# Patient Record
Sex: MALE | Race: WHITE | NOT HISPANIC OR LATINO | Employment: FULL TIME | URBAN - METROPOLITAN AREA
[De-identification: names, ages, dates, MRNs, and addresses within clinical notes are randomized per-mention and may not be internally consistent; named-entity substitution may affect disease eponyms.]

---

## 2021-03-30 ENCOUNTER — TELEPHONE (OUTPATIENT)
Dept: PSYCHIATRY | Facility: CLINIC | Age: 46
End: 2021-03-30

## 2021-03-30 NOTE — TELEPHONE ENCOUNTER
Behavorial Health Outpatient Intake Questions    Referred by: Therapist     Please advised interviewee that they need to answer all questions truthfully to allow for best care and any misrepresentations of information may affect their ability to be seen at this clinic   => Was this discussed? 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 ? If yes, can they speak to you on the phone? If they are too limited to speak to you on phone, refer out Yes IEP - special education, slight dyslexia    Are you taking any psychiatric medications? Yes    => If yes, who prescribes? If yes, are they injectable medications? Wellbutrin     Does the patient have a language barrier or hearing impairment? No    Have you been treated at [facility] by a therapist or a doctor in the past? If yes, who? No    Has the patient been hospitalized for mental health? No   If yes, how long ago was last hospitalization and where was it? Do you actively use alcohol or marijuana or illegal substances? If yes, what and how much - refer out to Drug and alcohol treatment if use is excessive or daily use of illegal substances No concerns of substance abuse are reported  Do you have a community treatment team or ? No    Legal History-     Does the patient have any history of arrests, snf/long-term time, or DUIs? Yes  If Yes-  1) What types of charges?  DUI in 1996  2) When were they last incarcerated? 3) Are they currently on parole or probation? Minor Child-    Who has custody of the child? Is there a custody agreement? If there is a custody agreement remind parent that they must bring a copy to the first appt or they will not be seen  Intake Team, please check with provider before scheduling if flags come up such as:  - complex case  - legal history (other than DUI)  - communication barrier concerns are present  - if, in your judgment, this needs further review    ACCEPTED as a patient Yes  => Appointment Date: Wednesday, August 17th at 11:00am with Brenda Spann     Referred Elsewhere? No    Name of Insurance Co: [facility] BS   Insurance ID# [id number]  [id number] Phone #  If ins is primary or secondary  If patient is a minor, parents information such as Name, D  O B of guarantor

## 2021-06-11 ENCOUNTER — OFFICE VISIT (OUTPATIENT)
Dept: PSYCHIATRY | Facility: CLINIC | Age: 46
End: 2021-06-11
Payer: COMMERCIAL

## 2021-06-11 DIAGNOSIS — F41.1 GAD (GENERALIZED ANXIETY DISORDER): Primary | ICD-10-CM

## 2021-06-11 DIAGNOSIS — F42.2 MIXED OBSESSIONAL THOUGHTS AND ACTS: ICD-10-CM

## 2021-06-11 PROCEDURE — 90792 PSYCH DIAG EVAL W/MED SRVCS: CPT | Performed by: NURSE PRACTITIONER

## 2021-06-13 PROBLEM — S62.306A CLOSED FRACTURE OF FIFTH METACARPAL BONE OF RIGHT HAND: Status: ACTIVE | Noted: 2021-04-20

## 2021-06-13 RX ORDER — BUPROPION HYDROCHLORIDE 150 MG/1
150 TABLET ORAL DAILY
COMMUNITY
End: 2021-06-14

## 2021-06-13 NOTE — PSYCH
No Call  No Show  Charge    Lani Davis no showed 06/21/21 appointment , staff called and left message to reschedule appointment     Treatment Plan not due at this session  No Call  No Show  Charge    Lani Davis no showed 06/21/21 appointment , staff called and left message to reschedule appointment         Patient was not able to communicate via [employer] or Facetime   Has been re-scheduled

## 2021-06-14 ENCOUNTER — TELEPHONE (OUTPATIENT)
Dept: BEHAVIORAL/MENTAL HEALTH CLINIC | Facility: CLINIC | Age: 46
End: 2021-06-14

## 2021-06-14 DIAGNOSIS — F41.1 GAD (GENERALIZED ANXIETY DISORDER): Primary | ICD-10-CM

## 2021-06-14 PROBLEM — F42.9 OCD (OBSESSIVE COMPULSIVE DISORDER): Status: ACTIVE | Noted: 2021-06-14

## 2021-06-14 RX ORDER — ESCITALOPRAM OXALATE 10 MG/1
10 TABLET ORAL DAILY
Qty: 30 TABLET | Refills: 3 | Status: SHIPPED | OUTPATIENT
Start: 2021-06-14 | End: 2021-08-05 | Stop reason: SDUPTHER

## 2021-06-14 NOTE — TELEPHONE ENCOUNTER
He saw you on Friday 6/11/21 and said you were going to call over a new medication  Nothing was sent

## 2021-06-23 NOTE — BH TREATMENT PLAN
TREATMENT PLAN (Medication Management Only)        [facility]    Name and Date of Birth:  Kelley Kaur 48 y o  1975  Date of Treatment Plan: June 23, 2021  Diagnosis/Diagnoses:    1  YULIANA (generalized anxiety disorder)    2  Mixed obsessional thoughts and acts      Strengths/Personal Resources for Self-Care: supportive family, ability to communicate well, ability to listen  Area/Areas of need (in own words): anxiety symptoms, mood instability  1  Long Term Goal: improve control of anxiety  Target Date:3 months - 9/23/2021  Person/Persons responsible for completion of goal: Hugh Elias  Short Term Objective (s) - How will we reach this goal?:   A  Provider new recommended medication/dosage changes and/or continue medication(s): continue current medications as prescribed  B  N/A   C  N/A  Target Date:3 months - 9/23/2021  Person/Persons Responsible for Completion of Goal: Hugh  Progress Towards Goals: continuing treatment  Treatment Modality: medication management every 3 months  Review due 180 days from date of this plan: 3 months - 9/23/2021  Expected length of service: ongoing treatment  My Physician/PA/NP and I have developed this plan together and I agree to work on the goals and objectives  I understand the treatment goals that were developed for my treatment

## 2021-06-23 NOTE — PSYCH
Reason for visit:   Chief Complaint   Patient presents with   [facility] Establish Care     This note was not shared with the patient due to reasonable likelihood of causing patient harm  HPI     Yasmani Mccormick is a 48 y o  male with a history of Anxiety and irritability who presents for psychiatric evaluation due to his need for medication to manage and control his behavior  He was seen accompanied by his live-in girlfriend who was very valuable in validating the information and further elaborating on sxs and issues  Mr Mychal Duran is convinced that his present problems were caused at birth, when forceps were applied to his head  He claims at at some point I his childhood the family was told that Yasmani Mccormick had a brain injury  He has difficulties reading and writing and was also diagnosed with dyslexia  Patient and girlfriend live in a house with 5 other people, including a child  Yasmani Mccormick is easily angered when "things are taken out of their proper places",   He also presents with anxiety, being fidgety, cannot sit still, and twitches  No depression reported  Sleep and appetite are good  Primary complaints include: agitation and relationship difficulties  Onset of symptoms was gradual starting several years ago with gradually worsening course since that time  Psychosocial Stressors: family             Review Of Systems:     Mood Anxiety   Behavior Compulsive Behavior   Thought Content Disturbing Thoughts, Feelings   General Relationship Problems   Personality Normal   Other Psych Symptoms Normal   Constitutional As Noted in HPI   ENT As Noted in HPI   Cardiovascular As Noted in HPI   Respiratory As Noted in HPI   Gastrointestinal As Noted in HPI   Genitourinary As Noted in HPI   Musculoskeletal As Noted in HPI   Integumentary As Noted in HPI   Neurological As Noted in HPI   Endocrine Normal    Other Symptoms Normal        Past Psychiatric History:      Past Inpatient Psychiatric Treatment:   None   Past Outpatient Psychiatric Treatment: none  Past Suicide Attempts:    no  Past Violent Behavior:    no  Past Psychiatric Medication Trials: Wellbutrin    Family Psychiatric History: History reviewed  No pertinent family history  Social History:    Education: 11th grade  Learning Disabilities: special education  Marital history: co-habitating  Living arrangement, social support: The patient lives in home with significant other  Occupational History:  at [employer] 72: poor support system  Other Pertinent History: Financial     Social History     Substance and Sexual Activity   Drug Use Not on file       Traumatic History:       Abuse: none reproted  Other Traumatic Events: no    The following portions of the patient's history were reviewed and updated as appropriate: allergies, current medications, past family history, past medical history, past social history, past surgical history and problem list      Social History     Socioeconomic History    Marital status: /Civil Union     Spouse name: Not on file    Number of children: Not on file    Years of education: Not on file    Highest education level: Not on file   Occupational History    Not on file   Tobacco Use    Smoking status: Not on file   Substance and Sexual Activity    Alcohol use: Not on file    Drug use: Not on file    Sexual activity: Not on file   Other Topics Concern    Not on file   Social History Narrative    Not on file     Social Determinants of Health     Financial Resource Strain:     Difficulty of Paying Living Expenses:    Food Insecurity:     Worried About Running Out of Food in the Last Year:     [place] of Food in the Last Year:    Transportation Needs:     Lack of Transportation (Medical):      Lack of Transportation (Non-Medical):    Physical Activity:     Days of Exercise per Week:     Minutes of Exercise per Session:    Stress:     Feeling of Stress :    Social Connections:     Frequency of Communication with Friends and Family:     Frequency of Social Gatherings with Friends and Family:     Attends Shinto Services:     Active Member of Clubs or Organizations:     Attends Club or Organization Meetings:     Marital Status:    Intimate Partner Violence:     Fear of Current or Ex-Partner:     Emotionally Abused:     Physically Abused:     Sexually Abused:      Social History     Social History Narrative    Not on file       Mental status:  Appearance restless and fidgety   Mood dysphoric   Affect affect appropriate    Speech sparse   Thought Processes poverty of thought was observed   Hallucinations no hallucinations present    Thought Content no delusions   Abnormal Thoughts no suicidal thoughts  and no homicidal thoughts    Orientation  oriented to person and place and time   Remote Memory short term memory intact and long term memory intact   Attention Span concentration intact   Intellect Appears to be of Average Intelligence   Insight Insight intact   Judgement judgment was intact   Muscle Strength Normal gait    Language Intact   Fund of Knowledge Adequate   Pain none   Pain Scale 0         Laboratory Results: No results found for this or any previous visit  Assessment/Plan:      Diagnoses and all orders for this visit:    YULIANA (generalized anxiety disorder)    Mixed obsessional thoughts and acts    Other orders  -     Discontinue: buPROPion (WELLBUTRIN XL) 150 mg 24 hr tablet; Take 150 mg by mouth daily          Treatment Recommendations- Risks Benefits         Immediate Medical/Psychiatric/Psychotherapy Treatments and Any Precautions: Start escitalopram 10 mg po qd   Follow up in 4 wks    Risks, Benefits And Possible Side Effects Of Medications:  Discussed with patient    Controlled Medication Discussion: No records found for controlled prescriptions according to Perla Flores 17

## 2021-08-05 DIAGNOSIS — F41.1 GAD (GENERALIZED ANXIETY DISORDER): ICD-10-CM

## 2021-08-05 RX ORDER — ESCITALOPRAM OXALATE 10 MG/1
10 TABLET ORAL DAILY
Qty: 30 TABLET | Refills: 0 | Status: SHIPPED | OUTPATIENT
Start: 2021-08-05